# Patient Record
Sex: FEMALE | Race: WHITE | URBAN - METROPOLITAN AREA
[De-identification: names, ages, dates, MRNs, and addresses within clinical notes are randomized per-mention and may not be internally consistent; named-entity substitution may affect disease eponyms.]

---

## 2017-06-05 ENCOUNTER — OFFICE VISIT (OUTPATIENT)
Dept: FAMILY MEDICINE | Facility: CLINIC | Age: 62
End: 2017-06-05

## 2017-06-05 VITALS
SYSTOLIC BLOOD PRESSURE: 116 MMHG | WEIGHT: 203.6 LBS | DIASTOLIC BLOOD PRESSURE: 76 MMHG | TEMPERATURE: 98.2 F | BODY MASS INDEX: 32.72 KG/M2 | HEART RATE: 67 BPM | OXYGEN SATURATION: 98 % | RESPIRATION RATE: 16 BRPM | HEIGHT: 66 IN

## 2017-06-05 DIAGNOSIS — E03.9 ACQUIRED HYPOTHYROIDISM: Primary | ICD-10-CM

## 2017-06-05 DIAGNOSIS — R63.1 POLYDIPSIA: ICD-10-CM

## 2017-06-05 DIAGNOSIS — F41.9 ANXIETY: ICD-10-CM

## 2017-06-05 RX ORDER — LEVOTHYROXINE SODIUM 50 UG/1
150 TABLET ORAL DAILY
Qty: 90 TABLET | Refills: 0 | Status: SHIPPED | OUTPATIENT
Start: 2017-06-05 | End: 2017-07-05

## 2017-06-05 RX ORDER — SERTRALINE HYDROCHLORIDE 25 MG/1
TABLET, FILM COATED ORAL DAILY
Qty: 30 TABLET | Refills: 0 | Status: CANCELLED | OUTPATIENT
Start: 2017-06-05 | End: 2017-07-05

## 2017-06-05 RX ORDER — SERTRALINE HYDROCHLORIDE 25 MG/1
25 TABLET, FILM COATED ORAL AT BEDTIME
Qty: 45 TABLET | Refills: 0 | Status: SHIPPED | OUTPATIENT
Start: 2017-06-05 | End: 2018-02-06

## 2017-06-05 NOTE — MR AVS SNAPSHOT
After Visit Summary   6/5/2017    Daija Burdick    MRN: [id number]           Patient Information     Date Of Birth          1955        Visit Information        Provider Department      6/5/2017 7:15 PM Clinician, Vikas marycruz [facility]        Today's Diagnoses     Acquired hypothyroidism    -  1    Polydipsia        Anxiety          Care Instructions    Patient Visit Summary    Patient Name: Daija Burdick  MRN: [id number]    Date of Visit: 6/5/2017    Principle Diagnosis: Review of medications for history of thyroid dysfunction, history of seizures, anxiety, and dry eyes    Physician's Recommendations/Instructions:     We will obtain labs today to reassess your thyroid function and initiate Levothyroxine. Please return to the clinic in one month to reassess your labs and thyroid function.     Will initiate Zoloft for your anxiety symptoms. We discussed you may experience side effects when first starting this medication such as increased anxiety, depression, or suicidal thoughts. If you experience severe side effects, please return to the clinic immediately and if you experience suicidal thoughts please call a help hotline ([phone]) and return to a clinic immediately.     You can obtain over the counter eye drops to relieve your dry eye symptoms.     Please follow up with the [facility] ([facility]) regarding your history of seizures. We do not have appropriate resources here to assess this condition.     Lab Tests Performed: TSH, A1C, BMP    Follow Up/Results: We will call you with your lab results. Please return to the clinic in around one month for follow up regarding your medications. If you are able, please bring a copy of your recent medical records     Referrals and Instructions: Please make an appointment with the [facility] ([facility]) to follow up on your history of seizures. They are a sliding scale clinic and can be  "reached at  [phone].     Physician: Jeison Augustin MD          Follow-ups after your visit        Who to contact     Please call your clinic at [phone] to:    Ask questions about your health    Make or cancel appointments    Discuss your medicines    Learn about your test results    Speak to your doctor   If you have compliments or concerns about an experience at your clinic, or if you wish to file a complaint, please contact [facility] Patient Relations at [phone] or email us at [email or web address]         Additional Information About Your Visit        [email or web address] Information     InRiver is an electronic gateway that provides easy, online access to your medical records. With InRiver, you can request a clinic appointment, read your test results, renew a prescription or communicate with your care team.     To sign up for InRiver visit the website at [email or web address]   You will be asked to enter the access code listed below, as well as some personal information. Please follow the directions to create your username and password.     Your access code is: [email or web address]  Expires: 9/3/2017  9:39 PM     Your access code will  in 90 days. If you need help or a new code, please contact your [facility] or call [phone] for assistance.        Care EveryWhere ID     This is your Care EveryWhere ID. This could be used by other organizations to access your [place] medical records  [id number]        Your Vitals Were     Pulse Temperature Respirations Height Pulse Oximetry BMI (Body Mass Index)    67 98.2  F (36.8  C) (Oral) 16 5' 6\" (167.6 cm) 98% 32.86 kg/m2       Blood Pressure from Last 3 Encounters:   17 116/76   16 98/74   16 124/78    Weight from Last 3 Encounters:   17 203 lb 9.6 oz (92.4 kg)   16 202 lb 8 oz (91.9 kg)   16 206 lb 1.6 oz (93.5 kg)              We Performed the " Following     Basic metabolic panel     Hemoglobin A1c     TSH with free T4 reflex          Today's Medication Changes          These changes are accurate as of: 6/5/17  9:39 PM.  If you have any questions, ask your nurse or doctor.               Start taking these medicines.        Dose/Directions    levothyroxine 50 MCG tablet   Commonly known as:  SYNTHROID/LEVOTHROID   Used for:  Acquired hypothyroidism   Started by:  Vikas Rivas Ump        Dose:  150 mcg   Take 3 tablets (150 mcg) by mouth daily   Quantity:  90 tablet   Refills:  0       sertraline 25 MG tablet   Commonly known as:  ZOLOFT   Used for:  Anxiety   Started by:  ClinicianVikas Ump        Dose:  25 mg   Take 1 tablet (25 mg) by mouth At Bedtime Increase to 2 tabs after 2 weeks.   Quantity:  45 tablet   Refills:  0            Where to get your medicines      Some of these will need a paper prescription and others can be bought over the counter.  Ask your nurse if you have questions.     Bring a paper prescription for each of these medications     levothyroxine 50 MCG tablet    sertraline 25 MG tablet                Primary Care Provider    None Specified       No primary provider on file.        Thank you!     Thank you for choosing [facility]  for your care. Our goal is always to provide you with excellent care. Hearing back from our patients is one way we can continue to improve our services. Please take a few minutes to complete the written survey that you may receive in the mail after your visit with us. Thank you!             Your Updated Medication List - Protect others around you: Learn how to safely use, store and throw away your medicines at www.disposemymeds.org.          This list is accurate as of: 6/5/17  9:39 PM.  Always use your most recent med list.                   Brand Name Dispense Instructions for use    Calcium Carb-Cholecalciferol 600-200 MG-UNIT Tabs    CALCIUM 600 + D    180 tablet    Take 1 tablet by  mouth 2 times daily       levothyroxine 50 MCG tablet    SYNTHROID/LEVOTHROID    90 tablet    Take 3 tablets (150 mcg) by mouth daily       * NATURE-THROID PO      Take 1 tablet by mouth daily       * Thyroid 97.5 MG Tabs     30 tablet    Take 1 tablet by mouth daily       sertraline 25 MG tablet    ZOLOFT    45 tablet    Take 1 tablet (25 mg) by mouth At Bedtime Increase to 2 tabs after 2 weeks.       * Notice:  This list has 2 medication(s) that are the same as other medications prescribed for you. Read the directions carefully, and ask your doctor or other care provider to review them with you.

## 2017-06-05 NOTE — Clinical Note
I have completed my note, please review, add tie in statement and close encounter.   Thanks!   [email or web address] [phone]

## 2017-06-06 RX ORDER — LEVETIRACETAM 500 MG/1
500 TABLET ORAL 2 TIMES DAILY
COMMUNITY
End: 2018-02-06

## 2017-06-06 RX ORDER — TYROSINE 500 MG
500 TABLET ORAL DAILY
COMMUNITY
End: 2018-02-06

## 2017-06-06 NOTE — NURSING NOTE
Patient is a 62 year old female on her first visit to the [facility]. She stated her visit was in order to get refills of some medications.

## 2017-06-06 NOTE — PATIENT INSTRUCTIONS
Patient Visit Summary    Patient Name: Daija Burdick  MRN: [id number]    Date of Visit: 6/5/2017    Principle Diagnosis: Review of medications for history of thyroid dysfunction, history of seizures, anxiety, and dry eyes    Physician's Recommendations/Instructions:     We will obtain labs today to reassess your thyroid function and initiate Levothyroxine. Please return to the clinic in one month to reassess your labs and thyroid function.     Will initiate Zoloft for your anxiety symptoms. We discussed you may experience side effects when first starting this medication such as increased anxiety, depression, or suicidal thoughts. If you experience severe side effects, please return to the clinic immediately and if you experience suicidal thoughts please call a help hotline ([phone]) and return to a clinic immediately.     You can obtain over the counter eye drops to relieve your dry eye symptoms.     Please follow up with the [facility] ([facility]) regarding your history of seizures. We do not have appropriate resources here to assess this condition.     Lab Tests Performed: TSH, A1C, BMP    Follow Up/Results: We will call you with your lab results. Please return to the clinic in around one month for follow up regarding your medications. If you are able, please bring a copy of your recent medical records     Referrals and Instructions: Please make an appointment with the [facility] ([facility]) to follow up on your history of seizures. They are a sliding scale clinic and can be reached at  [phone].     Physician: Jeison Augustin MD

## 2017-06-06 NOTE — NURSING NOTE
Patient is a 62 year old female on her first visit to the [facility]. She stated her visit was in order to get refills of some medications that she was prescribed previously. The medications she requested were levetivacetam 500 mg for seizures (last seizure in 2011), nature-throid 97.5 mg for thyroid, lexapro 10 mg for anxiety, and some options for eye drops. She was on these medications for about 5 years and then didn't take them for the past two years due to lack of insurance. States no adverse effects were felt from these medications and they worked well for her while she was on them. Reports feeling anxious lately and is looking forward to options for continuing these medications. Will communicate with the medical clinican team regarding her desire to start these medications again.    Kasie Curtis, SN

## 2017-06-06 NOTE — PROGRESS NOTES
"MEDICINE NOTE    SUBJECTIVE:   Daija Burdick is a 62 year old female who comes into clinic hoping to reinitiate medications for hypothyroidism, anxiety, and seizures after a medication hiatus of two years. She lost her employer-based insurance when she left a job in 2015 and has been uninsured since then. During this time she lived in [place] but has recently returned to [place] and is working part time jobs, but is still uninsured. Prior to losing her insurance, she had been taking Nature Thyroid each day for hypothyroidism, escitalopram for anxiety, and levetiracetam for seizure prevention following two seizures in 2011. She reports that she has had low energy and has noticed thinning and peeling of her fingernails, which she associates with her lack of thyroid medication. She also reports that she has been anxious recently, doesn't handle stress well, and says that she often has \"racing thoughts\" that she can't slow down. Finally, she asked if we could test her blood sugar over concern for DM (her mother had type 2 DM).    REVIEW OF SYSTEMS:  Gen: no fevers or chills  Skin: thinning and peeling of nails  Neuro: Occasional dizziness on lying down  Endo: Occasional hot flashes  Psych: anxiety    Past Medical History:   Diagnosis Date     Thyroid disease        Past Surgical History:   Procedure Laterality Date     CHOLECYSTECTOMY       GYN SURGERY      tubal       Family History   Problem Relation Age of Onset     Coronary Artery Disease Father      age 37       Social History     Social History     Marital status: Single     Spouse name: N/A     Number of children: N/A     Years of education: N/A     Occupational History     Not on file.     Social History Main Topics     Smoking status: Never Smoker     Smokeless tobacco: Never Used     Alcohol use Yes      Comment: 1-3 drinks at a time. Drinks occasionally.     Drug use: No     Sexual activity: No     Other Topics Concern     Not on file     Social History " "Narrative    Date of Service:2017     Name: Daija DE LA ROSA (Month, Day, Year of birth): 1955     MRN: [id number]     New Patient:              YES    Preferred Language: English     Needed:         NO    County of Residence:     Marital Status: Single    Household size:     Number of Dependents:    (Provide number)    Pregnant:             (YES / NO Answer required)    Average Monthly Income: $     Citizenship Status:     Notes on Assistance Programs:        2017 - Patient did not use CHW services. [place] and [place].       OBJECTIVE:  Physical Exam:  /76 (BP Location: Left arm, Patient Position: Chair)  Pulse 67  Temp 98.2  F (36.8  C) (Oral)  Resp 16  Ht 5' 6\" (167.6 cm)  Wt 203 lb 9.6 oz (92.4 kg)  SpO2 98%  BMI 32.86 kg/m2  The physical exam is generally normal. Patient appears well, alert and oriented x 3, pleasant, cooperative. Vitals are as noted by nurse. Neck supple and free of adenopathy, or masses. No thyromegaly. CLARITA. Ears, throat are normal. Chest is clear to IPPA. Heart sounds are normal, no murmurs, clicks, gallops or rubs.  Extremities are normal. Peripheral pulses are normal.  Skin is normal without suspicious lesions noted.    ASSESSMENT/PLAN:  Daija was seen today for refill request.    Diagnoses and all orders for this visit:    Acquired hypothyroidism  -     TSH with free T4 reflex  -     levothyroxine (SYNTHROID/LEVOTHROID) 50 MCG tablet; Take 3 tablets (150 mcg) by mouth daily    Polydipsia  -     Basic metabolic panel  -     Hemoglobin A1c    Anxiety  -     sertraline (ZOLOFT) 25 MG tablet; Take 1 tablet (25 mg) by mouth At Bedtime Increase to 2 tabs after 2 weeks.    Other orders  -     Cancel: sertraline (ZOLOFT) 25 MG tablet; Take by mouth daily    We decided to start Daija on levothyroxine (with TSH monitoring) and sertraline. Completed appropriate conversion from porcine thyroid hormone to synthroid. Sertraline was the most appropriate transition from " escitalopram to maintain a neutral side-effect profile and encourage sleep by taking it at night. Will slowly increase dose as tolerated/indicated.    We referred her to [facility] for follow-up regarding seizures and whether or not she needs to be prophylactically medicated, but chose not to start seizure medications for now. We also requested A1C and a basic metabolic panel to assess risk for Type 2 DM.    Labs unable to be completed today, will draw these at her next visit    Med Clinician: Huy Bill MS1  Preceptor: Jeison Augustin      In supervising the medical student, I repeated the exam documented above.  I have reviewed and verified the student s documentation.  Supervising Provider: Jeison Augustin MD

## 2017-06-07 NOTE — PROGRESS NOTES
"[facility] Progress Note    Chief complaint:: Restart medications for anxiety and seizure     Subjective:  Condition 1: General Anxiety: Daija described her anxiety as her mind racing.  She began escitalopram 20 mg in 2013 and soon discontinued because it was too \"strong\".  Decreased down to 10 mg, had success with controlling anxiety and stress.   Patient denied having suicidal thoughts.  Condition 2: Seizures: Daija experienced a grand mal seizure in 2011 and was prescribed levetiracetam 500 mg twice daily.  Discontinued due to loss of insurance in 2015.  Recently had a dizzy spell that lasted about 20 seconds. Worried that a seizure might be coming and wants to start back on levetiracetam.  Condition 3: Hypothyroidism: Daija had been on Nature Thyroid 97.5 mg (equivalant to 150 mcg of levothyroxine), one tablet daily.  Did not obtain what her state was prior to starting medication, but since her prescription ran out in 9/16, she has felt less energy and says her fingernails feel \"thin\".  Wanted to restart that medication.    Condition 4: Dry Eyes: Daija experiences dry eyes and has used a gel eyedrop medication to help alleviate her dryness.  Uses it \"maybe a couple of times a week\" and has good improvement in eye comfort with use.  Would like to continue using this medication.  Current Outpatient Prescriptions   Medication Sig Dispense Refill     l-tyrosine 500 MG TABS Take 500 mg by mouth daily       sertraline (ZOLOFT) 25 MG tablet Take 1 tablet (25 mg) by mouth At Bedtime Increase to 2 tabs after 2 weeks. 45 tablet 0     levothyroxine (SYNTHROID/LEVOTHROID) 50 MCG tablet Take 3 tablets (150 mcg) by mouth daily 90 tablet 0     levETIRAcetam (KEPPRA) 500 MG tablet Take 500 mg by mouth 2 times daily       hypromellose (GENTEAL) ophthalmic gel 0.3% Place 1 drop into both eyes as needed for dry eyes (Used a couple times a week for dry eyes.  Improved dryness, experienced temprory blurred vision immediatly after " "administration.)       [DISCONTINUED] ESCITALOPRAM OXALATE PO Take 10 mg by mouth daily       Thyroid 97.5 MG TABS Take 1 tablet by mouth daily (Patient not taking: Reported on 6/6/2017) 30 tablet 0     Thyroid (NATURE-THROID PO) Take 1 tablet by mouth daily       Calcium Carb-Cholecalciferol (CALCIUM 600 + D) 600-200 MG-UNIT TABS Take 1 tablet by mouth 2 times daily (Patient not taking: Reported on 6/6/2017) 180 tablet 3         Objective: Daija presented in good spirits, with support from her friend.  She retained some bottles from her old prescriptions.  /76 (BP Location: Left arm, Patient Position: Chair)  Pulse 67  Temp 98.2  F (36.8  C) (Oral)  Resp 16  Ht 5' 6\" (167.6 cm)  Wt 203 lb 9.6 oz (92.4 kg)  SpO2 98%  BMI 32.86 kg/m2    Assessment:     Condition 1- Hypothyroidism  DTP: Daija needs additional drug therapy to treat her uncontrolled hypothyroidism.  Rationale: Goal was to obtain TSH baseline value and prescribe based on conversion of 97.5 mg Nature Thyroid strength with 150 mcg levothyroxine.  Unfortunately, the medical lab was unable to draw blood during this visit.  She will begin on 150 mcg of levothyroxine and receive follow up to possibly adjust dose.     Condition 2- Seizure  DTP: No Drug Therapy at this time.  Daija needs additional follow up with a provider to determine whether the cause of dizziness was related to seizures and whether prophylaxis is needed.   Rationale: The preceptor did not feel comfortable prescribing any medication for seizures since she hasn't had an occurrence that she knows about since the original Grand Mal in 2011.  She was referred to [facility].    Condition 3- Anxiety  DTP: Daija needs additional drug therapy to treat her uncontrolled anxiety.  Rationale: Daija described her experience with escitalopram 10 mg being helpful in controlling her racing mind.  Start her on a low dose of sertraline and monitor her response.      Plan:  1. Initiate levothyroxine 150 " mcg once daily and monitor TSH levels.  Patient plans to return to clinic on 6/12/17 for second attempt at drawing blood.  Goal to stabilize thyroid hormones with an improvement in the thickness of her fingernails and overall sense of energy in 4 weeks.   2. Initiate sertraline 25 mg once daily at bedtime for two weeks, then 50 mg at bedtime thereafter.  Goal to decrease anxiety from daily to less than 3 times a week in 4 weeks.  Plan to follow up at clinic in 1 week (6/12/17), to assess safety and efficacy of new med prior to dose increase.since she is already returning for labs on that day.  3. Referred Daija to [facility] for further evaluation regarding unexplained dizziness and potential need for seizure medication.  No medications at this time.  4. Patient returning next week for A1c test as precaution for at risk diabetes (family history).  Was unable to draw blood during today's visit.    Pharmacy Follow-Up Plan (Method, Date, Parameters): Follow up with Daija in person in one week (6/12/17) to have labs drawn for TSH, A1c, and basic metabolic.  If time during her visit, assess patient for efficacy of sertraline prior to dose increase from 25 mg to 50 mg seen as a reduction in anxious days from daily to 3 times a week or less.  Determine if she has experienced any upset stomach and diarrhea, an increase in dizziness or headaches, or increased fatigue. Follow up in 4 weeks by phone to assess the same safety concerns and further improvement of efficacy.  For the levothyroxine, follow up in four weeks by phone to determine if she is experiencing any side effects including irritability, weight loss, excessive sweating, palpitations or other heart concerns. Use TSH lab results to determine if she is within range (0.4-4.0 uU/L)    PharmCare Clinician: Diego Wan  Pharmacy Preceptor: Allegra Jaramillo, PharmD    _____________________________  Preceptor Use Only:  In supervising the student, I have reviewed and verified  the student's documentation and found it to be correct and complete.   Preceptor Signature: Allegra Jaramillo, Alexander

## 2017-06-08 ENCOUNTER — OFFICE VISIT (OUTPATIENT)
Dept: FAMILY MEDICINE | Facility: CLINIC | Age: 62
End: 2017-06-08

## 2017-12-06 ENCOUNTER — TRANSFERRED RECORDS (OUTPATIENT)
Dept: HEALTH INFORMATION MANAGEMENT | Facility: CLINIC | Age: 62
End: 2017-12-06

## 2018-01-20 ENCOUNTER — HEALTH MAINTENANCE LETTER (OUTPATIENT)
Age: 63
End: 2018-01-20

## 2018-01-22 ENCOUNTER — E-VISIT (OUTPATIENT)
Dept: PEDIATRICS | Facility: CLINIC | Age: 63
End: 2018-01-22
Payer: COMMERCIAL

## 2018-01-22 DIAGNOSIS — J06.9 VIRAL URI: Primary | ICD-10-CM

## 2018-01-22 PROCEDURE — 99444 ZZC PHYSICIAN ONLINE EVALUATION & MANAGEMENT SERVICE: CPT | Performed by: INTERNAL MEDICINE

## 2018-01-23 ASSESSMENT — ENCOUNTER SYMPTOMS
WEIGHT LOSS: 0
RECTAL PAIN: 0
INCREASED ENERGY: 0
BOWEL INCONTINENCE: 0
BACK PAIN: 1
POLYDIPSIA: 0
POLYPHAGIA: 0
ALTERED TEMPERATURE REGULATION: 1
STIFFNESS: 1
CONSTIPATION: 1
NECK PAIN: 1
ABDOMINAL PAIN: 1
POOR WOUND HEALING: 0
FATIGUE: 1
JAUNDICE: 0
NIGHT SWEATS: 0
CHILLS: 0
BLOATING: 1
NAIL CHANGES: 1
VOMITING: 0
HALLUCINATIONS: 0
FEVER: 0
DIARRHEA: 0
JOINT SWELLING: 0
DECREASED APPETITE: 0
MYALGIAS: 1
HEARTBURN: 1
BLOOD IN STOOL: 0
SKIN CHANGES: 0
MUSCLE WEAKNESS: 0
WEIGHT GAIN: 0
NAUSEA: 0
ARTHRALGIAS: 1
MUSCLE CRAMPS: 1

## 2018-02-06 ENCOUNTER — OFFICE VISIT (OUTPATIENT)
Dept: INTERNAL MEDICINE | Facility: CLINIC | Age: 63
End: 2018-02-06
Payer: COMMERCIAL

## 2018-02-06 VITALS
WEIGHT: 215 LBS | SYSTOLIC BLOOD PRESSURE: 132 MMHG | HEIGHT: 66 IN | TEMPERATURE: 97.8 F | BODY MASS INDEX: 34.55 KG/M2 | RESPIRATION RATE: 18 BRPM | HEART RATE: 60 BPM | OXYGEN SATURATION: 95 % | DIASTOLIC BLOOD PRESSURE: 84 MMHG

## 2018-02-06 DIAGNOSIS — R10.9 GASTRIC PAIN: ICD-10-CM

## 2018-02-06 DIAGNOSIS — Z12.31 VISIT FOR SCREENING MAMMOGRAM: ICD-10-CM

## 2018-02-06 DIAGNOSIS — Z11.59 NEED FOR HEPATITIS C SCREENING TEST: ICD-10-CM

## 2018-02-06 DIAGNOSIS — F32.A DEPRESSION, UNSPECIFIED DEPRESSION TYPE: ICD-10-CM

## 2018-02-06 DIAGNOSIS — Z11.3 SCREENING EXAMINATION FOR VENEREAL DISEASE: ICD-10-CM

## 2018-02-06 DIAGNOSIS — Z12.4 SCREENING FOR MALIGNANT NEOPLASM OF CERVIX: ICD-10-CM

## 2018-02-06 DIAGNOSIS — E03.9 HYPOTHYROIDISM, UNSPECIFIED TYPE: ICD-10-CM

## 2018-02-06 DIAGNOSIS — Z12.11 SPECIAL SCREENING FOR MALIGNANT NEOPLASMS, COLON: ICD-10-CM

## 2018-02-06 DIAGNOSIS — Z23 NEED FOR PROPHYLACTIC VACCINATION WITH TETANUS-DIPHTHERIA (TD): ICD-10-CM

## 2018-02-06 DIAGNOSIS — Z12.31 ENCOUNTER FOR SCREENING MAMMOGRAM FOR BREAST CANCER: Primary | ICD-10-CM

## 2018-02-06 DIAGNOSIS — Z23 NEED FOR PROPHYLACTIC VACCINATION AND INOCULATION AGAINST INFLUENZA: ICD-10-CM

## 2018-02-06 LAB
ALBUMIN SERPL-MCNC: 3.7 G/DL (ref 3.4–5)
ALP SERPL-CCNC: 100 U/L (ref 40–150)
ALT SERPL W P-5'-P-CCNC: 19 U/L (ref 0–50)
AMYLASE SERPL-CCNC: 41 U/L (ref 30–110)
ANION GAP SERPL CALCULATED.3IONS-SCNC: 7 MMOL/L (ref 3–14)
AST SERPL W P-5'-P-CCNC: 14 U/L (ref 0–45)
BILIRUB SERPL-MCNC: 0.4 MG/DL (ref 0.2–1.3)
BUN SERPL-MCNC: 14 MG/DL (ref 7–30)
CALCIUM SERPL-MCNC: 9.1 MG/DL (ref 8.5–10.1)
CHLORIDE SERPL-SCNC: 105 MMOL/L (ref 94–109)
CO2 SERPL-SCNC: 26 MMOL/L (ref 20–32)
CREAT SERPL-MCNC: 0.65 MG/DL (ref 0.52–1.04)
ERYTHROCYTE [DISTWIDTH] IN BLOOD BY AUTOMATED COUNT: 12.8 % (ref 10–15)
GFR SERPL CREATININE-BSD FRML MDRD: >90 ML/MIN/1.7M2
GLUCOSE SERPL-MCNC: 96 MG/DL (ref 70–99)
HCT VFR BLD AUTO: 40.6 % (ref 35–47)
HCV AB SERPL QL IA: NONREACTIVE
HGB BLD-MCNC: 13.4 G/DL (ref 11.7–15.7)
LIPASE SERPL-CCNC: 160 U/L (ref 73–393)
MCH RBC QN AUTO: 29.8 PG (ref 26.5–33)
MCHC RBC AUTO-ENTMCNC: 33 G/DL (ref 31.5–36.5)
MCV RBC AUTO: 90 FL (ref 78–100)
PLATELET # BLD AUTO: 187 10E9/L (ref 150–450)
POTASSIUM SERPL-SCNC: 4 MMOL/L (ref 3.4–5.3)
PROT SERPL-MCNC: 7.3 G/DL (ref 6.8–8.8)
RBC # BLD AUTO: 4.49 10E12/L (ref 3.8–5.2)
SODIUM SERPL-SCNC: 138 MMOL/L (ref 133–144)
SPECIMEN SOURCE: NORMAL
TSH SERPL DL<=0.005 MIU/L-ACNC: 0.66 MU/L (ref 0.4–4)
WBC # BLD AUTO: 3.7 10E9/L (ref 4–11)
WET PREP SPEC: NORMAL

## 2018-02-06 RX ORDER — ESCITALOPRAM OXALATE 10 MG/1
TABLET ORAL
COMMUNITY
Start: 2017-09-05 | End: 2018-02-06

## 2018-02-06 RX ORDER — ESCITALOPRAM OXALATE 10 MG/1
10 TABLET ORAL DAILY
Qty: 90 TABLET | Refills: 3 | Status: SHIPPED | OUTPATIENT
Start: 2018-02-06 | End: 2019-03-12

## 2018-02-06 ASSESSMENT — PAIN SCALES - GENERAL: PAINLEVEL: NO PAIN (0)

## 2018-02-06 NOTE — MR AVS SNAPSHOT
After Visit Summary   2/6/2018    Daija Burdick    MRN: [id number]           Patient Information     Date Of Birth          1955        Visit Information        Provider Department      2/6/2018 10:05 AM Lizeth Huang, APRN CNP [facility]        Today's Diagnoses     Encounter for screening mammogram for breast cancer    -  1    Need for hepatitis C screening test        Visit for screening mammogram        Screening for malignant neoplasm of cervix        Need for prophylactic vaccination and inoculation against influenza        Need for prophylactic vaccination with tetanus-diphtheria (TD)        Special screening for malignant neoplasms, colon        Depression, unspecified depression type        Gastric pain        Hypothyroidism, unspecified type           Follow-ups after your visit        Your next 10 appointments already scheduled     Feb 06, 2018 11:15 AM CST   LAB with  LAB   [facility] ([facility])    [address] [place] [id number] [phone]           Please do not eat 10-12 hours before your appointment if you are coming in fasting for labs on lipids, cholesterol, or glucose (sugar). This does not apply to pregnant women. Water, hot tea and black coffee (with nothing added) are okay. Do not drink other fluids, diet soda or chew gum.            Feb 20, 2018  8:00 AM CST   (Arrive by 7:45 AM)   MA SCREENING DIGITAL BILATERAL with [id number]   [facility] ([facility])    [address] [place] [id number] [phone]           Do not use any powder, lotion or deodorant under your arms or on your breast. If you do, we will ask you to remove it before your exam.  Wear comfortable, two-piece clothing.  If you have any allergies, tell your care team.  Bring any previous mammograms from other facilities or have them mailed to the breast  "center. Three-dimensional (3D) mammograms are available at [facility] locations in [place], [place], [place], [place], [facility], [place], [place], and [place]. [facility] locations include [place] and [facility] in [place]. Benefits of 3D mammograms include: - Improved rate of cancer detection - Decreases your chance of having to go back for more tests, which means fewer: - \"False-positive\" results (This means that there is an abnormal area but it isn't cancer.) - Invasive testing procedures, such as a biopsy or surgery - Can provide clearer images of the breast if you have dense breast tissue. 3D mammography is an optional exam that anyone can have with a 2D mammogram. It doesn't replace or take the place of a 2D mammogram. 2D mammograms remain an effective screening test for all women.  Not all insurance companies cover the cost of a 3D mammogram. Check with your insurance.            Feb 20, 2018  8:45 AM CST   LAB with  LAB   [facility] ([facility])    [address] [place] [id number] [phone]           Please do not eat 10-12 hours before your appointment if you are coming in fasting for labs on lipids, cholesterol, or glucose (sugar). This does not apply to pregnant women. Water, hot tea and black coffee (with nothing added) are okay. Do not drink other fluids, diet soda or chew gum.              Future tests that were ordered for you today     Open Future Orders        Priority Expected Expires Ordered    TSH Routine  2/6/2019 2/6/2018    CBC with platelets Routine 2/6/2018 2/20/2018 2/6/2018    Comprehensive metabolic panel Routine 2/6/2018 2/20/2018 2/6/2018    Lipase Routine 2/6/2018 2/20/2018 2/6/2018    Amylase Routine 2/6/2018 2/6/2019 2/6/2018    Fecal cancer screen FIT Routine 2/6/2018 5/7/2018 2/6/2018    Hepatitis C Screen Reflex to HCV RNA Quant and Genotype Routine 2/6/2018 2/6/2019 2/6/2018    MA SCREENING " "DIGITAL BILAT - Future  (s+30) Routine  2/6/2019 2/6/2018            Who to contact     Please call your clinic at [phone] to:    Ask questions about your health    Make or cancel appointments    Discuss your medicines    Learn about your test results    Speak to your doctor   If you have compliments or concerns about an experience at your clinic, or if you wish to file a complaint, please contact [facility] Patient Relations at [phone] or email us at [email or web address]         Additional Information About Your Visit        [employer] - /l?k â€¢ splik/harTrusteer Information     [employer] gives you secure access to your electronic health record. If you see a primary care provider, you can also send messages to your care team and make appointments. If you have questions, please call your primary care clinic.  If you do not have a primary care provider, please call [phone] and they will assist you.      [employer] is an electronic gateway that provides easy, online access to your medical records. With [employer], you can request a clinic appointment, read your test results, renew a prescription or communicate with your care team.     To access your existing account, please contact your [facility] Clinic or call [phone] for assistance.        Care EveryWhere ID     This is your Care EveryWhere ID. This could be used by other organizations to access your [place] medical records  [id number]        Your Vitals Were     Pulse Temperature Respirations Height Pulse Oximetry Breastfeeding?    60 97.8  F (36.6  C) (Oral) 18 1.676 m (5' 6\") 95% No    BMI (Body Mass Index)                   34.7 kg/m2            Blood Pressure from Last 3 Encounters:   02/06/18 132/84   06/05/17 116/76   04/12/16 98/74    Weight from Last 3 Encounters:   02/06/18 97.5 kg (215 lb)   06/05/17 92.4 kg (203 lb 9.6 oz)   04/12/16 91.9 kg (202 lb 8 oz)              We Performed the Following     FLU " VACCINE, 3 YRS +, IM  [58490]     HPV High Risk Types DNA Cervical     Pap imaged thin layer screen with HPV - recommended age 30 - 65 years (select HPV order below)          Today's Medication Changes          These changes are accurate as of 2/6/18 11:14 AM.  If you have any questions, ask your nurse or doctor.               These medicines have changed or have updated prescriptions.        Dose/Directions    escitalopram 10 MG tablet   Commonly known as:  LEXAPRO   This may have changed:    - how much to take  - how to take this  - when to take this   Used for:  Depression, unspecified depression type   Changed by:  Lizeth Huang APRN CNP        Dose:  10 mg   Take 1 tablet (10 mg) by mouth daily   Quantity:  90 tablet   Refills:  3       levothyroxine 50 MCG tablet   Commonly known as:  SYNTHROID/LEVOTHROID   This may have changed:  how much to take   Used for:  Acquired hypothyroidism        Dose:  150 mcg   Take 3 tablets (150 mcg) by mouth daily   Quantity:  90 tablet   Refills:  0       Thyroid 97.5 MG Tabs   This may have changed:  Another medication with the same name was removed. Continue taking this medication, and follow the directions you see here.   Used for:  Acquired hypothyroidism   Changed by:  Lizeth Huang APRN CNP        Dose:  1 tablet   Take 1 tablet by mouth daily   Quantity:  30 tablet   Refills:  0         Stop taking these medicines if you haven't already. Please contact your care team if you have questions.     Calcium Carb-Cholecalciferol 600-200 MG-UNIT Tabs   Commonly known as:  CALCIUM 600 + D   Stopped by:  Lizeth Huang APRN CNP           hypromellose 0.3 % Gel ophthalmic gel   Commonly known as:  GENTEAL   Stopped by:  Lizeth Huang APRN CNP           l-tyrosine 500 MG Tabs   Stopped by:  Lizeth Huang APRN CNP           levETIRAcetam 500 MG tablet   Commonly known as:  KEPPRA   Stopped by:  Lizeth Huang APRN CNP                Where to get  your medicines      These medications were sent to [facility] [address] [address] AT [facility] [address] [facility]  [address] [facility] [address]     Phone:  [phone]     escitalopram 10 MG tablet                Primary Care Provider Office Phone # Fax #    DEMETRIS Curtis [phone]       [address] [place] [address] [address]  [facility] [address]        Equal Access to Services     PLACIDO MCLAUGHLIN : Hadii aad ku hadasho Soomaali, waaxda luqadaha, qaybta kaalmada adeegyada, waxay idiin hayaan adeeg kharash la'wali damon. So [facility] [phone].    ATENCIÓN: Si habla español, tiene a cobos disposición servicios gratuitos de asistencia lingüística. [facility] [phone].    We comply with applicable federal civil rights laws and [place] laws. We do not discriminate on the basis of race, color, national origin, age, disability, sex, sexual orientation, or gender identity.            Thank you!     Thank you for choosing [facility]  for your care. Our goal is always to provide you with excellent care. Hearing back from our patients is one way we can continue to improve our services. Please take a few minutes to complete the written survey that you may receive in the mail after your visit with us. Thank you!             Your Updated Medication List - Protect others around you: Learn how to safely use, store and throw away your medicines at www.disposemymeds.org.          This list is accurate as of 2/6/18 11:14 AM.  Always use your most recent med list.                   Brand Name Dispense Instructions for use Diagnosis    escitalopram 10 MG tablet    LEXAPRO    90 tablet    Take 1 tablet (10 mg) by mouth daily    Depression, unspecified depression type       levothyroxine 50 MCG tablet    SYNTHROID/LEVOTHROID    90 tablet    Take 3 tablets (150 mcg) by mouth daily    Acquired hypothyroidism       Thyroid 97.5 MG Tabs     30 tablet    Take 1 tablet by mouth daily     Acquired hypothyroidism

## 2018-02-06 NOTE — NURSING NOTE
"FLU VACCINE QUESTIONNAIRE:  Ask the following questions of all parties who want influenza vaccination:     CONTRAINDICATIONS  1.  Is the patient age less than 6 months?  NO  2.  Has the person to be vaccinated ever had Guillain-Post Falls syndrome? NO  3.  Has the person to be vaccinated had the vaccine this year? NO  4.  Is the person to be vaccinated sick today? NO  5.  Does the person to be vaccinated have an allergy to eggs or a component of the vaccine? NO  6.  Has the person to vaccinated ever had a serious reaction to an influenza vaccination in the past? NO    If the answer to ALL of the above questions is \"No\", then please administer the influenza vaccine per the standard protocol.  If the patient answered \"Yes\" to questions 1 or 2, do not administer the vaccine. If the patient answered \"Yes\" to question 3, do not administer the vaccine unless the patient is a child receiving the vaccine in two doses. If the patient answered \"Yes\" to questions 4, 5, and/or 6, get additional details on sickness and/or reaction and refer to provider. If you have any questions regarding contraindications, please refer to the provider.                                                         INFLUENZA VACCINATION NOTE      Information sheet given to patient and questions answered.       "

## 2018-02-06 NOTE — NURSING NOTE
Chief Complaint   Patient presents with     Physical     Patient is here for annual physical     Hellen Lakhani CMA 9:47 AM on 2/6/2018.

## 2018-02-06 NOTE — PROGRESS NOTES
[facility]  Primary Care Center   Lizeth Huang, DEMETRIS CNP  2018     Chief Complaint:   Physical Examination; Establish Care.      History of Present Illness:   Daija Burdick is a 62 year old female with a history of hypothyroidism, depression/anxiety, osteopenia, and obstructive sleep apnea, who presents for a physical exam and to establish care with a new provider. She was previously a patient at [facility] as she did not have health insurance but has now obtained coverage. She was living in [place] for 8 years and moved back to [place] approx. one year ago. 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     Anxiety/Depression: She mentions she is no longer taking Zoloft and has switched over to Lexapro; her medication list has been updated. She has not had a dose of Lexapro in 10 days as her prescription  and indicates she has noticed a negative change in her mood without the medication. Mentions the winter months are typically difficult for her.     Abdominal Pain: Patient complaints of intermittent pain localized to her epigastric region with occasional radiation to her back that has been present for approximately two months. The pain occurs on a daily basis and typically presents after she eats. Consuming alcohol, coffee, or spicy foods does not seem change the nature of her symptoms. She avoid fruit juices.Does mention one day of heavy drinking several weeks ago, though she does not typically drink much alcohol and has since stopped drinking alcohol entirely. She noted abdominal pain increased then, especially over the epigastric area. She starting taking two tablets of Prilosec daily approximately 10 days ago. She has not yet noticed a difference in her symptoms. Has additionally tried Maalox in the past without improvement in her discomfort. Mentions concern for a gastric ulcer or pancreatic disease and would like further evaluation.     Labs 2017 at [facility]  Lipids;  LDL  "100/CHOL 184/ trigl 96/ HDL 65/  Non-  CMP: K 140  Cr .74  GFR 79  Glucos 95  Calcium 8.8  Bili 0.6  Alb  3.6  Pro,total 7.1  Alk ptase 97  ALT 18  AST 11  Vit d 23 (20-75  TSH 1.44    Other concerns discussed:  1. States she is due for an eye examination as she has not had one in 6-7 years.  She wears glasses.  2. Has not had a dental visit in one year. Reports ongoing periodontal complications.    3. Mentions her stools have recently normalized but were previously \"very thin.\"  4. She is due for a mammogram, Pap smear, colonoscopy, flu vaccination, and a TDAP. She is not interested in having the TDAP today and would like to wait until the time of her next visit.   5. Denies exercise.   6. Denies urinary complaints. Would like STD testing today.   7. Last menstrual period was approximately 9 years ago.   8. Believes she had a TSH and lipids within the last two months at [facility].     Review of Systems:   Pertinent items are noted in HPI.  All other systems are negative.    Answers for HPI/ROS submitted by the patient on 1/23/2018:   General Symptoms: Yes  Skin Symptoms: Yes  HENT Symptoms: No  EYE SYMPTOMS: No  HEART SYMPTOMS: No  LUNG SYMPTOMS: No  INTESTINAL SYMPTOMS: Yes  URINARY SYMPTOMS: No  GYNECOLOGIC SYMPTOMS: No  BREAST SYMPTOMS: No  SKELETAL SYMPTOMS: Yes  BLOOD SYMPTOMS: No  NERVOUS SYSTEM SYMPTOMS: No  MENTAL HEALTH SYMPTOMS: No  Fever: No  Loss of appetite: No  Weight loss: No  Weight gain: No  Fatigue: Yes  Night sweats: No  Chills: No  Increased stress: Yes  Excessive hunger: No  Excessive thirst: No  Feeling hot or cold when others believe the temperature is normal: Yes  Loss of height: No  Post-operative complications: No  Surgical site pain: No  Hallucinations: No  Change in or Loss of Energy: No  Hyperactivity: No  Confusion: No  Changes in hair: No  Changes in moles/birth marks: No  Itching: No  Rashes: No  Changes in nails: Yes  Acne: No  Hair in places you don't want it: No  Change in " facial hair: No  Warts: No  Non-healing sores: No  Scarring: No  Flaking of skin: No  Color changes of hands/feet in cold : No  Sun sensitivity: No  Skin thickening: No  Heart burn or indigestion: Yes  Nausea: No  Vomiting: No  Abdominal pain: Yes  Bloating: Yes  Constipation: Yes  Diarrhea: No  Blood in stool: No  Black stools: No  Rectal or Anal pain: No  Fecal incontinence: No  Yellowing of skin or eyes: No  Vomit with blood: No  Change in stools: No  Back pain: Yes  Muscle aches: Yes  Neck pain: Yes  Swollen joints: No  Joint pain: Yes  Bone pain: No  Muscle cramps: Yes  Muscle weakness: No  Joint stiffness: Yes  Bone fracture: No    Active Medications:      escitalopram (LEXAPRO) 10 MG tablet, , Disp: , Rfl:      levothyroxine (SYNTHROID/LEVOTHROID) 50 MCG tablet, Take 3 tablets (150 mcg) by mouth daily (Patient taking differently: Take 100 mcg by mouth daily ), Disp: 90 tablet, Rfl: 0     Thyroid 97.5 MG TABS, Take 1 tablet by mouth daily (Patient not taking: Reported on 2017), Disp: 30 tablet, Rfl: 0     Allergies:   Cortisone; Cymbalta; and Fioricet [butalbital-apap-caffeine].       Past Medical History:  Acquired hypothyroidism.   Osteopenia.   Obstructive sleep apnea.   Anxiety.   Depression.   Left knee osteoarthritis.      Past Surgical History:  Tubal ligation.   Cholecystectomy.   Left partial knee arthroplasty.     Family History:   Father: Positive for coronary artery disease,  at age 67.   Sister: Positive for unspecified autoimmune hepatitis disorder, .   Nephew: Positive for paroxysmal nocturnal hemoglobinuria.   Mother: Positive for type II diabetes, hyperlipidemia, and gallbladder removal.   Paternal grandfather: Positive for alcoholism.      Social History:   Never smoker. Denies alcohol use. Enjoys driving her elderly friends around and taking them on errands. Drives frequently. Denies exercise. Working part-time at an assisted living facility.       Physical Exam:   BP  "132/84 (BP Location: Right arm, Patient Position: Sitting, Cuff Size: Adult Large)  Pulse 60  Temp 97.8  F (36.6  C) (Oral)  Resp 18  Ht 1.676 m (5' 6\")  Wt 97.5 kg (215 lb)  SpO2 95%  Breastfeeding? No  BMI 34.7 kg/m2   Wt Readings from Last 1 Encounters:   02/06/18 97.5 kg (215 lb)   Constitutional: no distress, comfortable, pleasant   Eyes: anicteric, conjunctiva pink, normal extra-ocular movements. Glasses.   Ears, Nose and Throat: tympanic membranes clear,  throat clear. Teeth in good repair.   Neck: supple with full range of motion, no thyromegaly.   Cardiovascular: regular rate and rhythm, normal S1 and S2, no murmurs, rubs or gallops, peripheral pulses full and symmetric   Breast exam: without masses.  Respiratory: clear to auscultation, no wheezes or crackles, normal breath sounds   Gastrointestinal: positive bowel sounds, tender to palpation over epigastric and left upper quadrant, no hepatosplenomegaly, no masses.   Pelvic Exam:  Vulva: No external lesions, normal hair distribution, no adenopathy  Vagina: Moist, pink, no abnormal discharge, well rugated, no lesions  Cervix: Pap smear is taken, parous, smooth, pink, no visible lesions  Uterus: Normal size, anteverted, non-tender, mobile  Ovaries: No mass, non-tender, mobile  Rectal exam: No mass, non-tender, normal sphincter tone, hemoccult negative  Musculoskeletal: full range of motion, no edema   Skin: no concerning lesions, no jaundice, temp normal   Neurological:  normal gait, normal speech, no tremor. A and O x 3, good historian.  Psychological: appropriate mood, good eye contact, normal affect.  YUMIKO: 19                                                                                                                PHQ9: 7   Lymph: no axillary, cervical,  supraclavicular, infraclavicular or inguinal nodes.      Pelvic Exam:anterior vaginal wall collapse to entrance of introitus.   Vulva: No external lesions, normal hair distribution, no " adenopathy  Vagina: Moist, pink, no abnormal discharge, well rugated, no lesions  Cervix: Pap smear is taken, parous, smooth, pink, no visible lesions  Uterus: Normal size, anteverted, non-tender, mobile  Ovaries: Not palpable  Rectal exam: No mass, non-tender, normal sphincter tone, hemorrhids present but non-inflamed.      Assessment and Plan:  1. Routine Health Maintenance:   Pelvic exam and Pap smear were completed during her visit today. An order was placed for a mammogram which she will schedule at her own convenience. Flu vaccination was provided in clinic. Hepatitis C screen was ordered. She would like to wait until she is fasting to obtain a lipid panel. Refills for her medications were provided. She is due for a colonoscopy but would like to try a FIT screen instead which I feel is reasonable.     - Hepatitis C Screen Reflex to HCV RNA Quant and Genotype  - MA SCREENING DIGITAL BILAT - Future  (s+30)  - Pap imaged thin layer screen with HPV - recommended age 30 - 65 years (select HPV order below)  - HPV High Risk Types DNA Cervical  - FLU VACCINE, 3 YRS +, IM  [22316]  - escitalopram (LEXAPRO) 10 MG tablet  Dispense: 90 tablet; Refill: 3  - Fecal cancer screen FIT    2. Abdominal pain:   I have recommended Daija continue using the Prilosec twice daily for another two weeks. She will inform me if symptoms do not improve within this time frame. We will obtain laboratory work-up, including a lipase and amylase for further information.     - Comprehensive metabolic panel  - Lipase  - Amylase  - CBC with platelets        Follow-up: Follow up in one year for routine physical exam and Tdap.  Advised to have Tdap boostered immediately if she encounters a dirty wound.        Scribe Disclosure:   Raphael VIRGEN, am serving as a scribe to document services personally performed by DEMETRIS Aguilera CNP at this visit, based upon the provider's statements to me. All documentation has been reviewed by the  aforementioned provider prior to being entered into the official medical record.  Total time spent 45  minutes.  More than 50% of the time spent with Ms. Burdick on counseling / coordinating her care    Portions of this medical record were completed by a scribe. UPON MY REVIEW AND AUTHENTICATION BY ELECTRONIC SIGNATURE, this confirms (a) I performed the applicable clinical services, and (b) the record is accurate.   Lizeth WILLSON, CNP

## 2018-02-07 LAB
C TRACH DNA SPEC QL NAA+PROBE: NEGATIVE
N GONORRHOEA DNA SPEC QL NAA+PROBE: NEGATIVE
SPECIMEN SOURCE: NORMAL
SPECIMEN SOURCE: NORMAL

## 2018-02-08 LAB
COPATH REPORT: NORMAL
PAP: NORMAL

## 2018-02-08 ASSESSMENT — PATIENT HEALTH QUESTIONNAIRE - PHQ9: 5. POOR APPETITE OR OVEREATING: SEVERAL DAYS

## 2018-02-08 ASSESSMENT — ANXIETY QUESTIONNAIRES
2. NOT BEING ABLE TO STOP OR CONTROL WORRYING: SEVERAL DAYS
5. BEING SO RESTLESS THAT IT IS HARD TO SIT STILL: MORE THAN HALF THE DAYS
6. BECOMING EASILY ANNOYED OR IRRITABLE: MORE THAN HALF THE DAYS
3. WORRYING TOO MUCH ABOUT DIFFERENT THINGS: SEVERAL DAYS
1. FEELING NERVOUS, ANXIOUS, OR ON EDGE: MORE THAN HALF THE DAYS
GAD7 TOTAL SCORE: 10
7. FEELING AFRAID AS IF SOMETHING AWFUL MIGHT HAPPEN: SEVERAL DAYS

## 2018-02-09 ASSESSMENT — PATIENT HEALTH QUESTIONNAIRE - PHQ9: SUM OF ALL RESPONSES TO PHQ QUESTIONS 1-9: 7

## 2018-02-09 ASSESSMENT — ANXIETY QUESTIONNAIRES: GAD7 TOTAL SCORE: 10

## 2018-02-13 LAB
FINAL DIAGNOSIS: NORMAL
HPV HR 12 DNA CVX QL NAA+PROBE: NEGATIVE
HPV16 DNA SPEC QL NAA+PROBE: NEGATIVE
HPV18 DNA SPEC QL NAA+PROBE: NEGATIVE
SPECIMEN DESCRIPTION: NORMAL
SPECIMEN SOURCE CVX/VAG CYTO: NORMAL

## 2018-02-20 ENCOUNTER — RADIANT APPOINTMENT (OUTPATIENT)
Dept: MAMMOGRAPHY | Facility: CLINIC | Age: 63
End: 2018-02-20
Attending: NURSE PRACTITIONER
Payer: COMMERCIAL

## 2018-02-20 ENCOUNTER — TELEPHONE (OUTPATIENT)
Dept: INTERNAL MEDICINE | Facility: CLINIC | Age: 63
End: 2018-02-20

## 2018-02-20 DIAGNOSIS — E03.9 ACQUIRED HYPOTHYROIDISM: Primary | ICD-10-CM

## 2018-02-20 DIAGNOSIS — E03.9 ACQUIRED HYPOTHYROIDISM: ICD-10-CM

## 2018-02-20 DIAGNOSIS — Z13.220 SCREENING FOR CHOLESTEROL LEVEL: ICD-10-CM

## 2018-02-20 DIAGNOSIS — Z53.9 ERRONEOUS ENCOUNTER--DISREGARD: ICD-10-CM

## 2018-02-20 DIAGNOSIS — Z12.31 VISIT FOR SCREENING MAMMOGRAM: ICD-10-CM

## 2018-05-01 ENCOUNTER — OFFICE VISIT (OUTPATIENT)
Dept: INTERNAL MEDICINE | Facility: CLINIC | Age: 63
End: 2018-05-01
Payer: COMMERCIAL

## 2018-05-01 VITALS
BODY MASS INDEX: 34.38 KG/M2 | WEIGHT: 213 LBS | HEART RATE: 67 BPM | TEMPERATURE: 98.8 F | SYSTOLIC BLOOD PRESSURE: 137 MMHG | OXYGEN SATURATION: 96 % | RESPIRATION RATE: 16 BRPM | DIASTOLIC BLOOD PRESSURE: 81 MMHG

## 2018-05-01 DIAGNOSIS — G47.33 OSA (OBSTRUCTIVE SLEEP APNEA): ICD-10-CM

## 2018-05-01 DIAGNOSIS — J30.1 ACUTE SEASONAL ALLERGIC RHINITIS DUE TO POLLEN: ICD-10-CM

## 2018-05-01 DIAGNOSIS — E03.4 HYPOTHYROIDISM DUE TO ACQUIRED ATROPHY OF THYROID: Primary | ICD-10-CM

## 2018-05-01 DIAGNOSIS — E03.4 HYPOTHYROIDISM DUE TO ACQUIRED ATROPHY OF THYROID: ICD-10-CM

## 2018-05-01 LAB
T3 SERPL-MCNC: 94 NG/DL (ref 60–181)
T4 FREE SERPL-MCNC: 1.45 NG/DL (ref 0.76–1.46)
TSH SERPL DL<=0.005 MIU/L-ACNC: 0.15 MU/L (ref 0.4–4)

## 2018-05-01 RX ORDER — MONTELUKAST SODIUM 10 MG/1
10 TABLET ORAL AT BEDTIME
Qty: 30 TABLET | Refills: 3 | Status: SHIPPED | OUTPATIENT
Start: 2018-05-01 | End: 2018-10-18

## 2018-05-01 ASSESSMENT — PAIN SCALES - GENERAL: PAINLEVEL: NO PAIN (0)

## 2018-05-01 NOTE — MR AVS SNAPSHOT
After Visit Summary   5/1/2018    Daija Burdick    MRN: [id number]           Patient Information     Date Of Birth          1955        Visit Information        Provider Department      5/1/2018 4:40 PM Lizeth Huang APRN [facility]        Today's Diagnoses     Hypothyroidism due to acquired atrophy of thyroid    -  1    JOSE MANUEL (obstructive sleep apnea)        Acute seasonal allergic rhinitis due to pollen          Care Instructions        Lowest Med phone javier          Follow-ups after your visit        Your next 10 appointments already scheduled     May 01, 2018  5:30 PM CDT   LAB with [facility] ([facility])    [address] [place] [id number] [phone]           Please do not eat 10-12 hours before your appointment if you are coming in fasting for labs on lipids, cholesterol, or glucose (sugar). This does not apply to pregnant women. Water, hot tea and black coffee (with nothing added) are okay. Do not drink other fluids, diet soda or chew gum.              Future tests that were ordered for you today     Open Future Orders        Priority Expected Expires Ordered    T3 total Routine 5/1/2018 5/1/2019 5/1/2018    TSH with free T4 reflex Routine 5/1/2018 5/15/2018 5/1/2018            Who to contact     Please call your clinic at [phone] to:    Ask questions about your health    Make or cancel appointments    Discuss your medicines    Learn about your test results    Speak to your doctor            Additional Information About Your Visit        MyChart Information     Foody gives you secure access to your electronic health record. If you see a primary care provider, you can also send messages to your care team and make appointments. If you have questions, please call your primary care clinic.  If you do not have a primary care provider, please call [phone] and they will assist you.      [employer]  is an electronic gateway that provides easy, online access to your medical records. With "[employer] you can request a clinic appointment, read your test results, renew a prescription or communicate with your care team.     To access your existing account, please contact your [facility] or call [phone] for assistance.        Care EveryWhere ID     This is your Care EveryWhere ID. This could be used by other organizations to access your [place] medical records  [id number]        Your Vitals Were     Pulse Temperature Respirations Pulse Oximetry Breastfeeding? BMI (Body Mass Index)    67 98.8  F (37.1  C) (Oral) 16 96% No 34.38 kg/m2       Blood Pressure from Last 3 Encounters:   05/01/18 137/81   02/06/18 132/84   06/05/17 116/76    Weight from Last 3 Encounters:   05/01/18 96.6 kg (213 lb)   02/06/18 97.5 kg (215 lb)   06/05/17 92.4 kg (203 lb 9.6 oz)                 Today's Medication Changes          These changes are accurate as of 5/1/18  5:17 PM.  If you have any questions, ask your nurse or doctor.               Start taking these medicines.        Dose/Directions    montelukast 10 MG tablet   Commonly known as:  SINGULAIR   Used for:  Acute seasonal allergic rhinitis due to pollen   Started by:  Lizeth Huang APRN CNP        Dose:  10 mg   Take 1 tablet (10 mg) by mouth At Bedtime   Quantity:  30 tablet   Refills:  3       order for DME   Used for:  JOSE MANUEL (obstructive sleep apnea)   Started by:  Lizeth Huang APRN CNP        Equipment being ordered: CPAP   Quantity:  1 Device   Refills:  0         These medicines have changed or have updated prescriptions.        Dose/Directions    levothyroxine 50 MCG tablet   Commonly known as:  SYNTHROID/LEVOTHROID   This may have changed:  how much to take   Used for:  Acquired hypothyroidism        Dose:  150 mcg   Take 3 tablets (150 mcg) by mouth daily   Quantity:  90 tablet   Refills:  0            Where to get your  medicines      Some of these will need a paper prescription and others can be bought over the counter.  Ask your nurse if you have questions.     Bring a paper prescription for each of these medications     montelukast 10 MG tablet    order for DME                Primary Care Provider Office Phone # Fax #    DEMETRIS Curtis [phone]       [address] [place] [address] [address]  [facility] [address]        Equal Access to Services     PLACIDO MCLAUGHLIN : Hadii aad ku hadasho Soomaali, waaxda luqadaha, qaybta kaalmada adeegyada, waxay idiin hayaan adeeg khmartintaylor lainez . So [facility] [phone].    ATENCIÓN: Si natalio bertrand, tiene a cobos disposición servicios gratuitos de asistencia lingüística. Llame al [phone].    We comply with applicable federal civil rights laws and [place] laws. We do not discriminate on the basis of race, color, national origin, age, disability, sex, sexual orientation, or gender identity.            Thank you!     Thank you for choosing [facility]  for your care. Our goal is always to provide you with excellent care. Hearing back from our patients is one way we can continue to improve our services. Please take a few minutes to complete the written survey that you may receive in the mail after your visit with us. Thank you!             Your Updated Medication List - Protect others around you: Learn how to safely use, store and throw away your medicines at www.disposemymeds.org.          This list is accurate as of 5/1/18  5:17 PM.  Always use your most recent med list.                   Brand Name Dispense Instructions for use Diagnosis    escitalopram 10 MG tablet    LEXAPRO    90 tablet    Take 1 tablet (10 mg) by mouth daily    Depression, unspecified depression type       levothyroxine 50 MCG tablet    SYNTHROID/LEVOTHROID    90 tablet    Take 3 tablets (150 mcg) by mouth daily    Acquired hypothyroidism       montelukast 10 MG tablet    SINGULAIR    30 tablet     Take 1 tablet (10 mg) by mouth At Bedtime    Acute seasonal allergic rhinitis due to pollen       order for DME     1 Device    Equipment being ordered: CPAP    JOSE MANUEL (obstructive sleep apnea)       Thyroid 97.5 MG Tabs     30 tablet    Take 1 tablet by mouth daily    Acquired hypothyroidism

## 2018-05-01 NOTE — PROGRESS NOTES
[facility]  Primary Care Center   Lizeth PALMSebastian Phuongcristal, DEMETRIS CNP  05/01/2018      Chief Complaint:   Cough     History of Present Illness:   Daija Burdick is a 63 year old female with a history of acquired hypothyroidism and JOSE MANUEL who presents alone for evaluation of a cough. The patient has been experiencing post nasal drainage and a dry cough for two weeks. Her associated symptoms consist of ringing ears, a headache and reports of wheezing yesterday, but denies eye itchiness and aches. She has tried Pseudofed and Claritin, but experienced no relief. She tried Benadryl and reports that it helped her cough, but not her postnasal drainage. Her symptoms worsen when she is laying down so she has slept sitting up for the past two nights and has not been able to use her CPAP machine. She thought her symptoms were due to allergies, but began to have a productive cough with mostly clear sputum.     She states she is taking 100 mcg of levothyroxine a day.She states she has 100 mcg tabs at home.   Sometimes she takes 2 to try to lose weight. Her med list is in discrepancy.    Other concerns discussed:  1. The patient would like a new CPAP machine.    Review of Systems:   Pertinent items are noted in HPI, remainder of complete ROS is negative.      Active Medications:      escitalopram (LEXAPRO) 10 MG tablet, Take 1 tablet (10 mg) by mouth daily, Disp: 90 tablet, Rfl: 3     levothyroxine (SYNTHROID/LEVOTHROID) 50 MCG tablet, Take 3 tablets (150 mcg) by mouth daily (Patient taking differently: Take 100 mcg by mouth daily ), Disp: 90 tablet, Rfl: 0     Thyroid 97.5 MG TABS, Take 1 tablet by mouth daily (Patient not taking: Reported on 6/6/2017), Disp: 30 tablet, Rfl: 0      Allergies:   Cortisone; Cymbalta; Fioricet [butalbital-apap-caffeine]; and Sulfa drugs      Past Medical History:  Thyroid disease  Acquired hypothyroidism  Osteopenia  JOSE MANUEL (obstructive sleep apnea)     Past Surgical History:  Cholecystectomy  Gyn surgery,  tubal    Immunizations:  Immunization History   Administered Date(s) Administered     Influenza Vaccine IM 3yrs+ 4 Valent IIV4 02/20/2018     Family History:   Father: CAD      Social History:   The patient is single, a non-smoker and uses alcohol.    Physical Exam:   /81 (BP Location: Right arm, Patient Position: Sitting, Cuff Size: Adult Large)  Pulse 67  Temp 98.8  F (37.1  C) (Oral)  Resp 16  Wt 96.6 kg (213 lb)  SpO2 96%  Breastfeeding? No  BMI 34.38 kg/m2     Wt Readings from Last 1 Encounters:   05/01/18 96.6 kg (213 lb)     Constitutional: no acute distress.  Eyes: anicteric, conjunctiva pink.  Ears, Nose and Throat: tympanic membranes clear,  throat clear.   Neck: supple with full range of motion, no thyromegaly.   Cardiovascular: regular rate and rhythm, normal S1 and S2, no murmurs, rubs or gallops.  Respiratory: clear to auscultation, no wheezes or crackles, normal breath sounds   Skin: no concerning lesions, no jaundice, temp normal   Neurological: normal gait, normal speech, no tremor. A and O x 3, good historian.  Psychological: appropriate mood, good eye contact, normal affect   Lymph: no  cervical,  supraclavicular, infraclavicular nodes.  PHQ-9: 2  Assessment and Plan:  Hypothyroidism due to acquired atrophy of thyroid  She will send a picture of her bottle with label to me.   In order for me to order a TSH the patient wanted a her T3 and T4 levels screened.  - T3 total  - TSH with free T4 reflex    JOSE MANUEL (obstructive sleep apnea)  She would like a new CPAP machine and tubing.  - order for DME  Dispense: 1 Device; Refill: 0    Acute seasonal allergic rhinitis due to pollen  I recommended different allergy medicines along with a nasal spray the patient could use to stop inflammation in the sinuses. I also offered Singular and informed her how it works on a cellular level.   - montelukast (SINGULAIR) 10 MG tablet  Dispense: 30 tablet; Refill: 3    Scribe Disclosure:   Germania VIRGEN  Theodora, am serving as a scribe to document services personally performed by DEMETRIS Aguilera CNP at this visit, based upon the provider's statements to me. All documentation has been reviewed by the aforementioned provider prior to being entered into the official medical record.     Portions of this medical record were completed by a scribe. UPON MY REVIEW AND AUTHENTICATION BY ELECTRONIC SIGNATURE, this confirms (a) I performed the applicable clinical services, and (b) the record is accurate.   Total time spent 25 minutes.  More than 50% of the time spent with Ms. Burdick on counseling / coordinating her care    Lizeth WILLSON CNP

## 2018-05-01 NOTE — NURSING NOTE
Chief Complaint   Patient presents with     Cough     Patient is here to discuss cough, duration 2 weeks     Hellen Lakhani CMA 4:30 PM on 5/1/2018.

## 2018-05-02 ASSESSMENT — PATIENT HEALTH QUESTIONNAIRE - PHQ9: SUM OF ALL RESPONSES TO PHQ QUESTIONS 1-9: 2

## 2018-05-04 ENCOUNTER — TELEPHONE (OUTPATIENT)
Dept: INTERNAL MEDICINE | Facility: CLINIC | Age: 63
End: 2018-05-04

## 2018-05-04 NOTE — TELEPHONE ENCOUNTER
Current dose is 100 mcg.  Pt has krystal taking levothyroxine 100 mcg for 4 months. The previous dose was 150 mcg, then it was decreased to 100 mcg.    Soon-CUBA Manuel  ------------------------------------------------------------      ----- Message from DEMETRIS Curtis CNP sent at 5/3/2018  6:17 PM CDT -----  Could you call her and ask her to read her synthroid dose to you from her bottle.   Thanks.   Lizeth WILLSON, CNP

## 2018-05-08 DIAGNOSIS — E03.4 HYPOTHYROIDISM DUE TO ACQUIRED ATROPHY OF THYROID: Primary | ICD-10-CM

## 2018-05-08 DIAGNOSIS — E03.9 ACQUIRED HYPOTHYROIDISM: ICD-10-CM

## 2018-05-08 RX ORDER — LEVOTHYROXINE SODIUM 100 UG/1
100 TABLET ORAL DAILY
Qty: 90 TABLET | Refills: 3 | Status: SHIPPED | OUTPATIENT
Start: 2018-05-08

## 2018-10-18 ENCOUNTER — PATIENT OUTREACH (OUTPATIENT)
Dept: CARE COORDINATION | Facility: CLINIC | Age: 63
End: 2018-10-18

## 2018-10-18 ENCOUNTER — OFFICE VISIT (OUTPATIENT)
Dept: INTERNAL MEDICINE | Facility: CLINIC | Age: 63
End: 2018-10-18
Payer: COMMERCIAL

## 2018-10-18 VITALS
DIASTOLIC BLOOD PRESSURE: 77 MMHG | SYSTOLIC BLOOD PRESSURE: 158 MMHG | HEART RATE: 65 BPM | BODY MASS INDEX: 34.2 KG/M2 | WEIGHT: 211.9 LBS

## 2018-10-18 DIAGNOSIS — G40.909 SEIZURE DISORDER (H): ICD-10-CM

## 2018-10-18 DIAGNOSIS — E03.9 ACQUIRED HYPOTHYROIDISM: Primary | ICD-10-CM

## 2018-10-18 DIAGNOSIS — Z12.31 ENCOUNTER FOR SCREENING MAMMOGRAM FOR BREAST CANCER: ICD-10-CM

## 2018-10-18 DIAGNOSIS — E03.9 ACQUIRED HYPOTHYROIDISM: ICD-10-CM

## 2018-10-18 LAB — TSH SERPL DL<=0.005 MIU/L-ACNC: 1.14 MU/L (ref 0.4–4)

## 2018-10-18 RX ORDER — LEVETIRACETAM 500 MG/1
500 TABLET ORAL 2 TIMES DAILY
Qty: 60 TABLET | Refills: 0 | Status: SHIPPED | OUTPATIENT
Start: 2018-10-18 | End: 2018-11-12

## 2018-10-18 ASSESSMENT — PAIN SCALES - GENERAL: PAINLEVEL: NO PAIN (0)

## 2018-10-18 NOTE — PROGRESS NOTES
"[facility]   Lizeth Huang, APRN CNP  10/18/2018      Chief Complaint:   Seizures     History of Present Illness:   Daija Burdick is a 63 year old female with a history of hypothyroidism, osteopenia, and JOSE MANUEL, abnormal EEG in 2009 who presents for evaluation of a seizure. On June 16, 2009, Dr. Hennessy in Neurology wrote a letter to Dr. Melgar informing him that the patient may be having possible seizures after a near syncope episode. 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 The patient reports actually having a seizure in 3/2011. She reports taking Keppra 500mg two times a day around this time for 2 years, but her medication records show the prescription ending in 2/2018. Since then, she has been unsure if she \"has seizures\" or not until this past weekend. Four days ago, the patient reports waking up after having an unwitnessed seizure at home during the night. She awoke confused with blood on her pillow and a sore tongue. The patient proceeded to go to work at a [employer] where she reports emesis into a trash can and was sent home early, 5 hours later. Since then, she has continued working, but reports that she has been sleeping a lot. She denies noticing any unusual symptoms prior to the seizure. Denies headaches.      Other concerns discussed:  1. Declined flu and tetanus shots.  2. Last TSH on 5/1/18 at 0.15 was low. She is currently on 100mcg of levothyroxine each morning prior to breakfast.  3. Last mammogram February 20, 2018.  4. No longer uses Singulair.       Review of Systems:   Pertinent items are noted in HPI, remainder of complete ROS is negative.       Active Medications:      escitalopram (LEXAPRO) 10 MG tablet, Take 1 tablet (10 mg) by mouth daily, Disp: 90 tablet, Rfl: 3     levothyroxine (SYNTHROID/LEVOTHROID) 100 MCG tablet, Take 1 tablet (100 mcg) by mouth " daily, Disp: 90 tablet, Rfl: 3      Allergies:   Cortisone; Cymbalta; Fioricet [butalbital-apap-caffeine]; and Sulfa drugs       Past Medical History:  Hypothyroidism  Osteopenia  JOSE MANUEL       Past Surgical History:  Cholecystectomy  Tubal ligation      Immunizations:  Immunization History   Administered Date(s) Administered     Influenza Vaccine IM 3yrs+ 4 Valent IIV4 02/20/2018      Family History:   Father: coronary artery disease        Social History:   The patient is single, a nonsmoker and consumes alcohol.       Physical Exam:   /77  Pulse 65  Wt 96.1 kg (211 lb 14.4 oz)  BMI 34.2 kg/m2   Wt Readings from Last 1 Encounters:   10/18/18 96.1 kg (211 lb 14.4 oz)     Constitutional: no distress, comfortable, pleasant   Eyes: anicteric,  normal extra-ocular movements.   Cardiovascular:see VS  Skin: no concerning lesions, no jaundice, temp normal   Neurological: She cannot remember dates, such as when her last mammo was (thinks it was a few years ago at [facility] but it was 2/2018 at [facility]) normal speech, no tremor. A and O x 3.  Psychological: appropriate mood, good eye contact, normal affect      Assessment and Plan:  Daija was seen today for seizures.    Diagnoses and all orders for this visit:    Acquired hypothyroidism  The patient's last TSH on 5/1/18 was low at 0.15 and I would like this to be rechecked today for continued monitoring. If it is low again, we may need to decrease her levothyroxine. She is currently using 100mcg a day.  -     TSH; Future    Seizure disorder (H)  Due to the patient's recent seizure, she is going to restart Keppra .   -     levETIRAcetam (KEPPRA) 500 MG tablet; Take 1 tablet (500 mg) by mouth 2 times daily    Follow-up: Return one year or sooner depending on symptoms.         Scribe Disclosure:   I, Germania Yip, am serving as a scribe to document services personally performed by DEMETRIS Aguilera CNP at this visit, based upon the provider's statements to  me. All documentation has been reviewed by the aforementioned provider prior to being entered into the official medical record.     Portions of this medical record were completed by a scribe. UPON MY REVIEW AND AUTHENTICATION BY ELECTRONIC SIGNATURE, this confirms (a) I performed the applicable clinical services, and (b) the record is accurate.   Total time spent 25 minutes.  More than 50% of the time spent with Ms. Burdick on counseling / coordinating her care, looking through Care Everywhere and past notes due to her lack of memory regarding visits and locations.     Lizeth WILLSON, CNP

## 2018-10-18 NOTE — MR AVS SNAPSHOT
After Visit Summary   10/18/2018    Daija Burdick    MRN: [id number]           Patient Information     Date Of Birth          1955        Visit Information        Provider Department      10/18/2018 11:45 AM Lizeth Huang, APRN CNP [facility]        Today's Diagnoses     Acquired hypothyroidism    -  1    Seizure disorder (H)        Encounter for screening mammogram for breast cancer          Care Instructions    Primary Care Center Medication Refill Request Information:  * Please contact your pharmacy regarding ANY request for medication refills.  ** [facility] Prescription Fax = [phone]  * Please allow 3 business days for routine medication refills.  * Please allow 5 business days for controlled substance medication refills.     Primary Care Center Test Result notification information:  *You will be notified with in 7-10 days of your appointment day regarding the results of your test.  If you are on MyChart you will be notified as soon as the provider has reviewed the results and signed off on them.    [facility]: [phone]             Follow-ups after your visit        Future tests that were ordered for you today     Open Future Orders        Priority Expected Expires Ordered    TSH Routine 10/18/2018 10/18/2019 10/18/2018            Who to contact     Please call your clinic at [phone] to:    Ask questions about your health    Make or cancel appointments    Discuss your medicines    Learn about your test results    Speak to your doctor            Additional Information About Your Visit        MyChart Information     [employer] gives you secure access to your electronic health record. If you see a primary care provider, you can also send messages to your care team and make appointments. If you have questions, please call your primary care clinic.  If you do not have a primary care provider, please call [phone] and they will assist you.      [employer] is an  electronic gateway that provides easy, online access to your medical records. With [employer], you can request a clinic appointment, read your test results, renew a prescription or communicate with your care team.     To access your existing account, please contact your [facility] or call [phone] for assistance.        Care EveryWhere ID     This is your Care EveryWhere ID. This could be used by other organizations to access your [place] medical records  [id number]        Your Vitals Were     Pulse BMI (Body Mass Index)                65 34.2 kg/m2           Blood Pressure from Last 3 Encounters:   10/18/18 158/77   05/01/18 137/81   02/06/18 132/84    Weight from Last 3 Encounters:   10/18/18 96.1 kg (211 lb 14.4 oz)   05/01/18 96.6 kg (213 lb)   02/06/18 97.5 kg (215 lb)                 Today's Medication Changes          These changes are accurate as of 10/18/18 12:04 PM.  If you have any questions, ask your nurse or doctor.               Start taking these medicines.        Dose/Directions    levETIRAcetam 500 MG tablet   Commonly known as:  KEPPRA   Used for:  Seizure disorder (H)   Started by:  Lizeth Huang APRN CNP        Dose:  500 mg   Take 1 tablet (500 mg) by mouth 2 times daily   Quantity:  60 tablet   Refills:  0            Where to get your medicines      These medications were sent to [facility] [address] [place] [address] AT [facility] & [address] [address]     Phone:  [phone]     levETIRAcetam 500 MG tablet                Primary Care Provider Office Phone # Fax #    DEMETRIS Curtis [phone]       [address] [facility] [address]  [facility] [address]        Equal Access to Services     PLACIDO MCLAUGHLIN : Murray Palacios, angelique sullivan, qaybta kaalmajerrod floyd, darya damon. So [facility] [phone].    ATENCIÓN: Si nadeen ponce cobos  disposición servicios gratuitos de asistencia lingüística. Tate kebede [phone].    We comply with applicable federal civil rights laws and [place] laws. We do not discriminate on the basis of race, color, national origin, age, disability, sex, sexual orientation, or gender identity.            Thank you!     Thank you for choosing [facility]  for your care. Our goal is always to provide you with excellent care. Hearing back from our patients is one way we can continue to improve our services. Please take a few minutes to complete the written survey that you may receive in the mail after your visit with us. Thank you!             Your Updated Medication List - Protect others around you: Learn how to safely use, store and throw away your medicines at www.disposemymeds.org.          This list is accurate as of 10/18/18 12:04 PM.  Always use your most recent med list.                   Brand Name Dispense Instructions for use Diagnosis    escitalopram 10 MG tablet    LEXAPRO    90 tablet    Take 1 tablet (10 mg) by mouth daily    Depression, unspecified depression type       levETIRAcetam 500 MG tablet    KEPPRA    60 tablet    Take 1 tablet (500 mg) by mouth 2 times daily    Seizure disorder (H)       levothyroxine 100 MCG tablet    SYNTHROID/LEVOTHROID    90 tablet    Take 1 tablet (100 mcg) by mouth daily    Hypothyroidism due to acquired atrophy of thyroid       order for DME     1 Device    Equipment being ordered: CPAP    JOSE MANUEL (obstructive sleep apnea)

## 2018-10-18 NOTE — PATIENT INSTRUCTIONS
[facility] Medication Refill Request Information:  * Please contact your pharmacy regarding ANY request for medication refills.  ** [facility] Prescription Fax = [phone]  * Please allow 3 business days for routine medication refills.  * Please allow 5 business days for controlled substance medication refills.     [facility] Test Result notification information:  *You will be notified with in 7-10 days of your appointment day regarding the results of your test.  If you are on MyChart you will be notified as soon as the provider has reviewed the results and signed off on them.    [facility]: [phone]

## 2018-10-18 NOTE — NURSING NOTE
Chief Complaint   Patient presents with     Seizures     pt states she had a seizure last Saturday       Bambi Spring CMA at 11:32 AM on 10/18/2018.

## 2018-10-23 ENCOUNTER — TELEPHONE (OUTPATIENT)
Dept: NEUROLOGY | Facility: CLINIC | Age: 63
End: 2018-10-23

## 2018-10-23 NOTE — TELEPHONE ENCOUNTER
Daija had a seizure last night while sleeping. She had worked 14 hours prior. She woke up the next morning and threw up into the waste basket. She has had on/off headaches. She feels like her head is spinning. Usually takes a few minutes before she feels better.  Afebrile.     She has recently started on -500.     Current/confirmed ASD's    LEV (500) 500-500    PLAN:   Advised to contact DEMETRIS Ogden for medication management.  Return visit scheduled with Dr. Hennessy on 12/7/18 at 8:35 AM

## 2018-11-12 DIAGNOSIS — G40.909 SEIZURE DISORDER (H): ICD-10-CM

## 2018-11-13 RX ORDER — LEVETIRACETAM 500 MG/1
500 TABLET ORAL 2 TIMES DAILY
Qty: 180 TABLET | Refills: 3 | Status: SHIPPED | OUTPATIENT
Start: 2018-11-13

## 2018-11-13 NOTE — TELEPHONE ENCOUNTER
Last Clinic Visit: 10/18/2018  [facility]    *last note reviewed/protocol

## 2018-11-27 NOTE — TELEPHONE ENCOUNTER
FUTURE VISIT INFORMATION      FUTURE VISIT INFORMATION:    Date: 12/7/18    Time: 8.50a    [place] [facility] [place] INFORMATION:    Referring provider:  Self referred    Referring providers clinic:  N/A    Reason for visit/diagnosis  Seizures    RECORDS REQUESTED FROM:       Clinic name Comments Records Status Imaging Status   [facility] Neurology Dr. Hennessy 10/27/09 Internal [facility]                                     11/27/18: Self referral. Patient last seen in [facility] in 2009. Latest records and imaging in [facility].

## 2018-12-07 ENCOUNTER — OFFICE VISIT (OUTPATIENT)
Dept: NEUROLOGY | Facility: CLINIC | Age: 63
End: 2018-12-07
Payer: COMMERCIAL

## 2018-12-07 ENCOUNTER — PRE VISIT (OUTPATIENT)
Dept: NEUROLOGY | Facility: CLINIC | Age: 63
End: 2018-12-07

## 2018-12-07 VITALS
HEIGHT: 66 IN | SYSTOLIC BLOOD PRESSURE: 136 MMHG | BODY MASS INDEX: 33.59 KG/M2 | HEART RATE: 75 BPM | WEIGHT: 209 LBS | DIASTOLIC BLOOD PRESSURE: 64 MMHG | OXYGEN SATURATION: 100 % | TEMPERATURE: 96 F

## 2018-12-07 DIAGNOSIS — G40.909 SEIZURE DISORDER (H): Primary | ICD-10-CM

## 2018-12-07 RX ORDER — LAMOTRIGINE 25 MG/1
TABLET ORAL
Qty: 252 TABLET | Refills: 0 | Status: SHIPPED | OUTPATIENT
Start: 2018-12-07 | End: 2018-12-07

## 2018-12-07 RX ORDER — LAMOTRIGINE 25 MG/1
TABLET ORAL
Qty: 252 TABLET | Refills: 11 | Status: SHIPPED | OUTPATIENT
Start: 2018-12-07 | End: 2019-04-02

## 2018-12-07 ASSESSMENT — ENCOUNTER SYMPTOMS
POLYPHAGIA: 0
SKIN CHANGES: 0
NUMBNESS: 0
PANIC: 0
WEIGHT LOSS: 1
SORE THROAT: 0
TREMORS: 0
BLOATING: 0
INCREASED ENERGY: 1
CHILLS: 0
SINUS CONGESTION: 0
SEIZURES: 1
POLYDIPSIA: 0
NECK MASS: 1
VOMITING: 0
WEAKNESS: 0
SPEECH CHANGE: 0
FEVER: 0
POOR WOUND HEALING: 0
EYE PAIN: 1
BLOOD IN STOOL: 0
ABDOMINAL PAIN: 0
JAUNDICE: 0
RECTAL PAIN: 0
HEARTBURN: 0
ALTERED TEMPERATURE REGULATION: 1
DECREASED APPETITE: 1
PARALYSIS: 0
HALLUCINATIONS: 0
TROUBLE SWALLOWING: 0
DIARRHEA: 0
SINUS PAIN: 0
HOARSE VOICE: 0
DOUBLE VISION: 0
WEIGHT GAIN: 0
FATIGUE: 1
NIGHT SWEATS: 0
BOWEL INCONTINENCE: 0
NAUSEA: 1
NAIL CHANGES: 1
DEPRESSION: 1
LOSS OF CONSCIOUSNESS: 0
MEMORY LOSS: 1
DIZZINESS: 1
TINGLING: 0
EYE IRRITATION: 1
DISTURBANCES IN COORDINATION: 0
TASTE DISTURBANCE: 0
HEADACHES: 1
EYE REDNESS: 0
INSOMNIA: 0
CONSTIPATION: 1
SMELL DISTURBANCE: 0
DECREASED CONCENTRATION: 1
NERVOUS/ANXIOUS: 1
EYE WATERING: 0

## 2018-12-07 ASSESSMENT — PAIN SCALES - GENERAL: PAINLEVEL: NO PAIN (0)
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
## 2018-12-07 NOTE — NURSING NOTE
Chief Complaint   Patient presents with     RECHECK     NOT SEEN SINCE 2009. SEIZURES       Preventive Care:    Colorectal Cancer Screening: During our visit today, we discussed that it is recommended you receive colorectal cancer screening. Please call or make an appointment with your primary care provider to discuss this. You may also call the [facility] scheduling line ([phone]) to set up a colonoscopy appointment.      Cristal Lord MA

## 2018-12-07 NOTE — MR AVS SNAPSHOT
After Visit Summary   12/7/2018    aDija Burdick    MRN: [id number]           Patient Information     Date Of Birth          1955        Visit Information        Provider Department      12/7/2018 8:50 AM Kenny Hennessy MD [facility] Neurology        Today's Diagnoses     Seizure disorder (H)    -  1       Follow-ups after your visit        Your next 10 appointments already scheduled     Dec 07, 2018 12:00 PM CST   (Arrive by 11:45 AM)   In Lab Video Visit with  EEG TECH 2   EEG [facility]IENT ([facility])    [address]  3rd Floor  [facility] [address] [phone]            Dec 17, 2018  7:45 PM CST   MR BRAIN W/O & W CONTRAST with [id number]   [facility] MRI ([facility])    [address] [place] [address]  [address] [facility] [id number] [phone]           How do I prepare for my exam? (Food and drink instructions) **If you will be receiving sedation or general anesthesia, please see special notes below.**  How do I prepare for my exam? (Other instructions) Take your medicines as usual, unless your doctor tells you not to. You may or may not receive intravenous (IV) contrast for this exam pending the discretion of the Radiologist.  You do not need to do anything special to prepare.  **If you will be receiving sedation or general anesthesia, please see special notes below.**  What should I wear: The MRI machine uses a strong magnet. Please wear clothes without metal (snaps, zippers). A sweatsuit works well, or we may give you a hospital gown. Please remove any body piercings and hair extensions before you arrive. You will also remove watches, jewelry, hairpins, wallets, dentures, partial dental plates and hearing aids. You may wear contact lenses, and you may be able to wear your rings. We have a safe place to keep your personal items, but it is safer to leave them at home.  How long does the exam take: Most tests take 30  to 60 minutes.  HOWEVER, IF YOUR DOCTOR PRESCRIBES ANESTHESIA please plan on spending four to five hours in the recovery room.  What should I bring:  Bring a list of your current medicines to your exam (including vitamins, minerals and over-the-counter drugs).  Do I need a :  **If you will be receiving sedation or general anesthesia, please see special notes below.**  What should I do after the exam: No Restrictions, You may resume normal activities.  What is this test: MRI (magnetic resonance imaging) uses a strong magnet and radio waves to look inside the body. An MRA (magnetic resonance angiogram) does the same thing, but it lets us look at your blood vessels. A computer turns the radio waves into pictures showing cross sections of the body, much like slices of bread. This helps us see any problems more clearly. You may receive fluid (called  contrast ) before or during your scan. The fluid helps us see the pictures better. We give the fluid through an IV (small needle in your arm).  Who should I call with questions:  Please call the Imaging Department at your exam site with any questions. Directions, parking instructions, and other information is available on our website, [email or web address].  How do I prepare if I m having sedation or anesthesia? **IMPORTANT** THE INSTRUCTIONS BELOW ARE ONLY FOR THOSE PATIENTS WHO HAVE BEEN TOLD THEY WILL RECEIVE SEDATION OR GENERAL ANESTHESIA DURING THEIR MRI PROCEDURE:  IF YOU WILL RECEIVE SEDATION (take medicine to help you relax during your exam): You must get the medicine from your doctor before you arrive. Bring the medicine to the exam. Do not take it at home. Arrive one hour early. Bring someone who can take you home after the test. Your medicine will make you sleepy. After the exam, you may not drive, take a bus or take a taxi by yourself. No eating 8 hours before your exam. You may have clear liquids up until 4 hours before your exam. (Clear liquids include  "water, clear tea, black coffee and fruit juice without pulp.)  IF YOU WILL RECEIVE ANESTHESIA (be asleep for your exam): Arrive 1 1/2 hours early. Bring someone who can take you home after the test. You may not drive, take a bus or take a taxi by yourself. No eating 8 hours before your exam. You may have clear liquids up until 4 hours before your exam. (Clear liquids include water, clear tea, black coffee and fruit juice without pulp.)              Who to contact     Please call your clinic at [phone] to:    Ask questions about your health    Make or cancel appointments    Discuss your medicines    Learn about your test results    Speak to your doctor            Additional Information About Your Visit        Com[employer] Information     [employer] gives you secure access to your electronic health record. If you see a primary care provider, you can also send messages to your care team and make appointments. If you have questions, please call your primary care clinic.  If you do not have a primary care provider, please call [phone] and they will assist you.      [employer] is an electronic gateway that provides easy, online access to your medical records. With [employer], you can request a clinic appointment, read your test results, renew a prescription or communicate with your care team.     To access your existing account, please contact your [facility] or call [phone] for assistance.        Care EveryWhere ID     This is your Care EveryWhere ID. This could be used by other organizations to access your [facility] medical records  [id number]        Your Vitals Were     Pulse Temperature Height Pulse Oximetry BMI (Body Mass Index)       75 96  F (35.6  C) 1.676 m (5' 6\") 100% 33.73 kg/m2        Blood Pressure from Last 3 Encounters:   12/07/18 136/64   10/18/18 158/77   05/01/18 137/81    Weight from Last 3 Encounters:   12/07/18 94.8 kg (209 lb)   10/18/18 96.1 kg (211 lb 14.4 oz) "   05/01/18 96.6 kg (213 lb)              We Performed the Following     MR Brain w/o & w Contrast          Today's Medication Changes          These changes are accurate as of 12/7/18  9:46 AM.  If you have any questions, ask your nurse or doctor.               Start taking these medicines.        Dose/Directions    lamoTRIgine 25 MG tablet   Commonly known as:  laMICtal   Used for:  Seizure disorder (H)   Started by:  Kenny Hennessy MD        Start at 25mg every night at bedtime then increase by 25 mg weekly to a target dose of 100mg twice daily. Per Comments below   Quantity:  252 tablet   Refills:  11            Where to get your medicines      These medications were sent to [facility] [id number] [place] [address] AT [address] [address]     Phone:  [phone]     lamoTRIgine 25 MG tablet                Primary Care Provider Office Phone # Fax #    Lizeth Huang, APRN [phone]       [address] [place] [address]  [facility] [address]        Equal Access to Services     [facility]: Hadii dharmesh ku hadasho Soaggie, waaxda luqadaha, qaybta kaalmada adeegyajerrod, darya franklin . So [facility] [phone].    ATENCIÓN: Si habla español, tiene a cobos disposición servicios gratuitos de asistencia lingüística. [facility] [phone].    We comply with applicable federal civil rights laws and [place] laws. We do not discriminate on the basis of race, color, national origin, age, disability, sex, sexual orientation, or gender identity.            Thank you!     Thank you for choosing [facility] NEUROLOGY  for your care. Our goal is always to provide you with excellent care. Hearing back from our patients is one way we can continue to improve our services. Please take a few minutes to complete the written survey that you may receive in the mail after your visit with us. Thank you!             Your Updated Medication List - Protect  others around you: Learn how to safely use, store and throw away your medicines at www.disposemymeds.org.          This list is accurate as of 12/7/18  9:46 AM.  Always use your most recent med list.                   Brand Name Dispense Instructions for use Diagnosis    escitalopram 10 MG tablet    LEXAPRO    90 tablet    Take 1 tablet (10 mg) by mouth daily    Depression, unspecified depression type       lamoTRIgine 25 MG tablet    laMICtal    252 tablet    Start at 25mg every night at bedtime then increase by 25 mg weekly to a target dose of 100mg twice daily. Per Comments below    Seizure disorder (H)       levETIRAcetam 500 MG tablet    KEPPRA    180 tablet    Take 1 tablet (500 mg) by mouth 2 times daily    Seizure disorder (H)       levothyroxine 100 MCG tablet    SYNTHROID/LEVOTHROID    90 tablet    Take 1 tablet (100 mcg) by mouth daily    Hypothyroidism due to acquired atrophy of thyroid       order for DME     1 Device    Equipment being ordered: CPAP    JOSE MANUEL (obstructive sleep apnea)

## 2018-12-17 ENCOUNTER — ANCILLARY PROCEDURE (OUTPATIENT)
Dept: MRI IMAGING | Facility: CLINIC | Age: 63
End: 2018-12-17
Attending: PSYCHIATRY & NEUROLOGY
Payer: COMMERCIAL

## 2018-12-17 RX ORDER — GADOBUTROL 604.72 MG/ML
10 INJECTION INTRAVENOUS ONCE
Status: COMPLETED | OUTPATIENT
Start: 2018-12-17 | End: 2018-12-17

## 2018-12-17 RX ADMIN — GADOBUTROL 10 ML: 604.72 INJECTION INTRAVENOUS at 19:01

## 2018-12-18 NOTE — PROCEDURES
Procedure Date: 2018      EEG #:        DATE OF RECORDIN2018      DURATION OF RECORDIN hours 45 minutes      CLINICAL HISTORY:  This patient is a 63-year-old woman with a history of intractable epilepsy.  EEG was requested for followup studies for her seizures.      CURRENT MEDICATIONS:  Lamictal, Lexapro.      TECHNICAL SUMMARY: This continuous video- EEG monitoring procedure was performed with 23 scalp electrodes in 10-20 electrode system placements, and additional scalp, precordial and other surface electrodes used for electrical referencing and artifact detection.  Video monitoring was utilized and periodically reviewed by EEG technologists and the physician for electroclinical correlations.    RESULTS:   BACKGROUND ACTIVITIES: During maximal wakefulness, there is a symmetric, moderate amplitude, well formed, approximately 10 Hz poorly formed posterior dominant rhythm, which attenuated with eye opening. Stage I and II sleep were recorded with well formed sleep architectures including vertex sharp transients and sleep spindles. Hyperventilation produced no change of the background activities.  Photic stimulation produced no driving responses.  INTERICTAL ACTIVITIES: There are no epileptiform abnormalities.  There was occasional left temporal theta slowing during this recording, which is of unknown clinical significance.   ICTAL ACTIVITIES: There are no clinical or electrographic seizures during this recording.  IMPRESSION:  This is a normal waking and sleep EEG.        GIRMA GA MD             D: 2018   T: 2018   MT: wesley      Name:     SIMON ESPINAL   MRN:      [id number]        Account:        [id number]   :      1955           Procedure Date: 2018      Document: [id number]

## 2018-12-18 NOTE — DISCHARGE INSTRUCTIONS

## 2018-12-27 ENCOUNTER — VIRTUAL VISIT (OUTPATIENT)
Dept: NEUROLOGY | Facility: CLINIC | Age: 63
End: 2018-12-27
Payer: COMMERCIAL

## 2018-12-27 DIAGNOSIS — G40.909 RECURRENT SEIZURES (H): Primary | ICD-10-CM

## 2019-01-07 ENCOUNTER — VIRTUAL VISIT (OUTPATIENT)
Dept: NEUROLOGY | Facility: CLINIC | Age: 64
End: 2019-01-07
Payer: COMMERCIAL

## 2019-01-07 DIAGNOSIS — G40.909 RECURRENT SEIZURES (H): Primary | ICD-10-CM

## 2019-01-07 NOTE — PROGRESS NOTES
Called patient as scheduled.     54-year-old right-handed female presented seizures. She had 2 GTCs in her life. She also had a probable CPS many years ago. She starte don keppra 500 mg bid since Oct. 2018. She had a lot of GI side effects, with loss of appetite. She would like to change to a different AED.  She started on Lamictal since the last visit. Now she is on Lamictal 50-75, keppra 500 mg bid.  Recent EEG was normal. MRI did not show any acute finding.    Plan:  1. Lamictal will target 100 mg bid.  2. Once Lamictal reaches 100 mg bid, will decrease keppra to 250 mg bid for 2 weeks, then discontinue.      Time spent: 12 min

## 2019-03-12 DIAGNOSIS — F32.A DEPRESSION, UNSPECIFIED DEPRESSION TYPE: ICD-10-CM

## 2019-03-12 RX ORDER — ESCITALOPRAM OXALATE 10 MG/1
10 TABLET ORAL DAILY
Qty: 90 TABLET | Refills: 0 | Status: SHIPPED | OUTPATIENT
Start: 2019-03-12 | End: 2019-06-11

## 2019-06-03 ENCOUNTER — OFFICE VISIT (OUTPATIENT)
Dept: FAMILY MEDICINE | Facility: CLINIC | Age: 64
End: 2019-06-03

## 2019-06-11 DIAGNOSIS — F32.A DEPRESSION, UNSPECIFIED DEPRESSION TYPE: ICD-10-CM

## 2019-06-11 RX ORDER — ESCITALOPRAM OXALATE 10 MG/1
10 TABLET ORAL DAILY
Qty: 30 TABLET | Refills: 0 | Status: SHIPPED | OUTPATIENT
Start: 2019-06-11

## 2019-06-11 NOTE — TELEPHONE ENCOUNTER
escitalopram (LEXAPRO) 10 MG tablet      Last Written Prescription Date:  3/12/19  Last Fill Quantity: 90,   # refills: 0  Last Office Visit : 10/18/18  Future Office visit: none    30 day to pharmacy. Patient instructed to call and schedule appointment.     Routing refill request to provider for review/approval because:  PHQ9 and appointment needed.

## 2019-06-17 ENCOUNTER — OFFICE VISIT (OUTPATIENT)
Dept: FAMILY MEDICINE | Facility: CLINIC | Age: 64
End: 2019-06-17

## 2019-06-17 DIAGNOSIS — M25.562 LEFT KNEE PAIN, UNSPECIFIED CHRONICITY: Primary | ICD-10-CM

## 2019-06-18 NOTE — PATIENT INSTRUCTIONS
Patient Visit Summary    Patient Name: Daija Burdick  MRN: [id number]    Date of Visit: 6/17/2019    Principle Diagnosis:Left knee pain and instability     Physical Therapist's Recommendations/Instructions:     Patient displays knee range of motion within normal limits, bilaterally comparable. Patient continues to have some left knee strength deficits, hip abductor and extensor control impairments, and impaired balance that can be further addressed with skilled physical therapy intervention. Patient has not had any falls since her prior episode, however, would further benefit from at least two more weeks of skilled physical therapy to address the above impairments/functional limitations and be re-evaluated at that time.    DPT

## 2019-06-18 NOTE — PROGRESS NOTES
"S: Patient reports pain decreased to a 1/10 with an occasional \"twinge\" causing pain up to an 8/10. She has been compliant with her home exercise program with the exception of the glute bridges. Her job requires her to lift 35 pounds and she states that she doesn't feel comfortable doing that yet. Patient shows some fear avoidance with using her left knee.      O: ROM WNL, strength testing showed L knee flexion and extension and L and R hip flexion were 4/5. R knee flexion and extension 5/5.      Home exercise program: patient demonstrated good form with clam shells and long arc quads. A yellow resistance band was added to increase difficulty. Patient was instructed on how to properly perform glute bridges and mini-squats. 2 sets of 10 reps 2x/day for all exercises.      A: Patient is progressing toward goals as expected and has excellent compliance with her home exercise program.      P: Progress exercises as needed and re- assess knee stability and standing endurance in order for patient to return to work.       "

## 2019-07-01 ENCOUNTER — OFFICE VISIT (OUTPATIENT)
Dept: FAMILY MEDICINE | Facility: CLINIC | Age: 64
End: 2019-07-01

## 2019-07-03 ENCOUNTER — TRANSFERRED RECORDS (OUTPATIENT)
Dept: HEALTH INFORMATION MANAGEMENT | Facility: CLINIC | Age: 64
End: 2019-07-03

## 2019-07-29 ENCOUNTER — APPOINTMENT (OUTPATIENT)
Dept: FAMILY MEDICINE | Facility: CLINIC | Age: 64
End: 2019-07-29

## 2019-07-29 ENCOUNTER — TRANSFERRED RECORDS (OUTPATIENT)
Dept: HEALTH INFORMATION MANAGEMENT | Facility: CLINIC | Age: 64
End: 2019-07-29

## 2019-07-29 LAB — HEMOCCULT STL QL IA: NEGATIVE

## 2019-09-29 ENCOUNTER — HEALTH MAINTENANCE LETTER (OUTPATIENT)
Age: 64
End: 2019-09-29

## 2021-01-14 ENCOUNTER — HEALTH MAINTENANCE LETTER (OUTPATIENT)
Age: 66
End: 2021-01-14

## 2021-05-29 ENCOUNTER — RECORDS - HEALTHEAST (OUTPATIENT)
Dept: ADMINISTRATIVE | Facility: CLINIC | Age: 66
End: 2021-05-29

## 2021-05-30 ENCOUNTER — RECORDS - HEALTHEAST (OUTPATIENT)
Dept: ADMINISTRATIVE | Facility: CLINIC | Age: 66
End: 2021-05-30

## 2021-05-31 ENCOUNTER — RECORDS - HEALTHEAST (OUTPATIENT)
Dept: ADMINISTRATIVE | Facility: CLINIC | Age: 66
End: 2021-05-31

## 2021-06-02 ENCOUNTER — RECORDS - HEALTHEAST (OUTPATIENT)
Dept: ADMINISTRATIVE | Facility: CLINIC | Age: 66
End: 2021-06-02

## 2021-10-24 ENCOUNTER — HEALTH MAINTENANCE LETTER (OUTPATIENT)
Age: 66
End: 2021-10-24

## 2022-02-13 ENCOUNTER — HEALTH MAINTENANCE LETTER (OUTPATIENT)
Age: 67
End: 2022-02-13

## 2022-10-15 ENCOUNTER — HEALTH MAINTENANCE LETTER (OUTPATIENT)
Age: 67
End: 2022-10-15

## 2023-03-26 ENCOUNTER — HEALTH MAINTENANCE LETTER (OUTPATIENT)
Age: 68
End: 2023-03-26